# Patient Record
Sex: FEMALE | Race: WHITE
[De-identification: names, ages, dates, MRNs, and addresses within clinical notes are randomized per-mention and may not be internally consistent; named-entity substitution may affect disease eponyms.]

---

## 2019-03-24 ENCOUNTER — HOSPITAL ENCOUNTER (EMERGENCY)
Dept: HOSPITAL 75 - ER | Age: 24
Discharge: HOME | End: 2019-03-24
Payer: COMMERCIAL

## 2019-03-24 VITALS — HEIGHT: 62 IN | WEIGHT: 125 LBS | BODY MASS INDEX: 23 KG/M2

## 2019-03-24 VITALS — SYSTOLIC BLOOD PRESSURE: 120 MMHG | DIASTOLIC BLOOD PRESSURE: 58 MMHG

## 2019-03-24 DIAGNOSIS — R11.2: Primary | ICD-10-CM

## 2019-03-24 LAB
APTT PPP: YELLOW S
BACTERIA #/AREA URNS HPF: (no result) /HPF
BILIRUB UR QL STRIP: NEGATIVE
FIBRINOGEN PPP-MCNC: CLEAR MG/DL
GLUCOSE UR STRIP-MCNC: NEGATIVE MG/DL
KETONES UR QL STRIP: NEGATIVE
LEUKOCYTE ESTERASE UR QL STRIP: NEGATIVE
NITRITE UR QL STRIP: NEGATIVE
PH UR STRIP: 7 [PH] (ref 5–9)
PROT UR QL STRIP: NEGATIVE
RBC #/AREA URNS HPF: (no result) /HPF
SP GR UR STRIP: 1.01 (ref 1.02–1.02)
UROBILINOGEN UR-MCNC: NORMAL MG/DL
WBC #/AREA URNS HPF: (no result) /HPF

## 2019-03-24 PROCEDURE — 84703 CHORIONIC GONADOTROPIN ASSAY: CPT

## 2019-03-24 PROCEDURE — 81000 URINALYSIS NONAUTO W/SCOPE: CPT

## 2019-03-24 PROCEDURE — 99283 EMERGENCY DEPT VISIT LOW MDM: CPT

## 2019-03-24 NOTE — XMS REPORT
Continuity of Care Document (C-CDA R2.1) (Encounter date: 2019 01:48 PM)

 Created on: 2019



Kim Lee

External Reference #: [id number]

: 1995

Sex: Female



Demographics







 Address  [address] [place]  [address]

 

 Home Phone  [phone]

 

 Preferred Language  Unknown

 

 Marital Status  Never 

 

 Episcopal Affiliation  Unknown

 

 Race  White

 

 Ethnic Group  Not  or 





Author







 Author  [facility]

 

 Organization  [facility]

 

 Address  Unknown

 

 Phone  Unavailable







Support







 Name  Relationship  Address  Phone

 

 CHRISTINE Lee  PRS  [address] [place]  [address]  [phone]

 

 KIMMIE Alvarez  Caregiver  Unknown  Unavailable







Care Team Providers







 Care Team Member Name  Role  Phone

 

 Marcell Holguin MD  Unavailable  Unavailable







Allergies, Adverse Reactions, Alerts







 Substance  Reaction  Status

 

 No Known Drug Allergies                                Active







Medications







 Medication  Instructions  Dosage  Effective Dates (start - stop)  Status  
Comments

 

 Aleve 220 mg capsule         -   Active   

 

 acetaminophen 325 mg tablet  take 1 tablet by oral route  every 4 hours as 
needed  325 MG   -   Active   







Problems







 Condition  Effective Dates (start - stop)  Clinical Status  Comments

 

 Abnormal Pap, LSIL  [place] -       

 

 Abnormal Pap, LSIL         

 

 Encounter for pregnancy test, result negative  [place] -       

 

 Pelvic and perineal pain         







Procedures







 Procedure  Date

 

 No information 







Results







 Test Name  Date and Time  Measure  Units  Reference Range  Abnormal Flag  
Status  Comments

 

 No information 







Advance Directives







 Directive  Yes / No  Effective Date  File Name

 

 No information 







Encounters







 Encounter Description  Practice  Location  Reason(s) For Visit  Diagnoses  Date
  Provider  Providers Copied on Encounter

 

    [facility],  [address] [place] [address], [id number],  tel:
[phone]  [facility]          Chelsie Bell [address] [place], [address], [place].  tel:[phone]   

 

    [facility], [address], [place], [place], [address], [place] tel:
[phone]  [facility]     Abnormal Pap, LSILAbnormal Pap, LSILEncounter for 
pregnancy test, result negative  [place]  Chelsie Bell [address], [place], [address], [place].  tel:[phone]  Referring Provider: Bobby [address] [place], [place], [address]. tel:[phone]

 

    [facility], [address], [place], [place], [address], [place] tel:
[phone]  [facility]     Pelvic and perineal pain    Chelsie Faith. [address] [place], [place], [address], [place].  tel:[phone]  Referring 
Provider: Marcell BURKS, [address], [place], [place], [address]. tel:[phone]
[phone]

 

    [facility], [address], [place], [place], [address], [place] tel:
[phone]  [facility]          Chelsie Faith. [address], [place], [address], [place].  tel:[phone]   







Family History







 Family Member  Diagnosis  Age At Onset

 

    No family history of Thyroid Disorder   

 

    No family history of Kidney Problems   

 

    No family history of Cardiovascular Disease   

 

    No family history of Stroke   

 

    No family history of Diabetes   

 

    No family history of Colon Cancer   

 

    No family history of Epilepsy   

 

    No family history of Lung Disease   

 

    No family history of Breast Cancer   

 

    No family history of Ovarian Cancer   

 

    No family history of Osteoporosis   

 

    No family history of Hypertension   







Immunizations







 Vaccine  Date  Status  Comments

 

 No information 







Payers







 Payer name  Insurance type  Covered party ID  Authorization(s)

 

 [facility]  [id number]   







Social History







 Type  Description  Quantity  Date Captured  Comments

 

 Birth Sex  Female         







Vital Signs







 Date / Time:  Height  Weight  BMI  Pulse Rate  Blood Pressure  Temperature  
Respiratory Rate  Body Surface Area  Head Circumference  BMI percentile  Pulse 
Ox  Inhaled Ox

 

 No information 







Chief Complaint And Reason For Visit





No information



Reason For Referral







 Reason For Referral

 

 No information







Plan Of Treatment







 Date  Type  Action  Status

 

   Appointment  Kim Lee  BOOKJOSE RAUL



 



History Of Present Illness







 Encounter Date  Complaint  History Of Present Illness

 

 No information 







Functional Status







 Date  Functional Assessment

 

 No information 







Medications Administered







 Medication  Instructions  Dosage  Effective Dates (start - stop)  Status  
Comments

 

 No information 







Instructions







 Date  Instruction  Additional Information

 

 No information 







Assessments







 Type  Assessment  Date

 

 No information 







Goals







 Health Concern  Goal  Type  Priority  Status  Date

 

 No information 







Medical Equipment







 Description  Device  Universal Device Identifier  Effective Dates (start - stop
)  Status

 

 No information 







Mental Status







 Date  Cognitive Assessment

 

 No information 







Health Concerns







 Observation  Date









 No information 









 Concern  Status  Date

 

 No information

## 2019-03-24 NOTE — XMS REPORT
Continuity of Care Document (C-CDA) (Encounter date: 01/15/2018 09:30 AM)

 Created on: 01/15/2018



Kim Lee

External Reference #: [id number]

: 1995

Sex: Female



Demographics







 Address  [address] [place]  [address]

 

 Home Phone  [phone]

 

 Preferred Language  English

 

 Marital Status  Never 

 

 Yazidi Affiliation  Unknown

 

 Race  White

 

 Ethnic Group  Not  or 





Author







 Author  [facility]

 

 Organization  [facility]

 

 Address  [address], [place]  [address]



 

 Phone  [phone]







Support







 Name  Relationship  Address  Phone

 

 CHRISTINE Lee  PRS  [address] [place]  [address]  [phone]

 

 KIMMIE Alvarez  Unknown  Unavailable







Care Team Providers







 Care Team Member Name  Role  Phone

 

 Antonio Alvarez MD  PCP  Unavailable







Allergies, Adverse Reactions, Alerts







 Substance  Reaction  Severity  Status

 

 No Known Drug Allergies                                Unknown  Active







Medications







 Medication  Instructions  Dosage  Effective Dates (start - stop)  Status  
Comments

 

 Aleve 220 mg capsule         -   Active   

 

 acetaminophen 325 mg tablet  take 1 tablet by oral route  every 4 hours as 
needed      -   Active   







Problems







 Condition  Effective Dates (start - stop)  Clinical Status

 

 Abnormal Pap, LSIL  [place] -    

 

 Abnormal Pap, LSIL      

 

 Pelvic and perineal pain      







Procedures







 Procedure  Date

 

 Colposcpy Of Cervix W/ Upper/adj Vagina; W/Bx Of Cervix & Endocervical 
Curettage  [place]-

 

 Procedure Only Visit  [place]-







Results







 Test Name  Date and Time  Measure  Units  Reference Range  Abnormal Flag  
Comments

 

 Unknown 







Advance Directives







 Directive  Yes / No  Effective Date  File Name

 

 Unknown 







Encounters







 Encounter Description  Practice  Location  Reason(s) For Visit  Diagnoses  Date
  Provider  Care Team Members

 

    [facility], [address], [place], [place], [address], [place] tel:
[phone]  [facility]  colposcopy (chief complaint)  Abnormal Pap, 
LSILAbnormal Pap, LSIL  [place]-  Chelsie Faith. [address] [place], [place]
, [address], [place].  tel:[phone]  Referring Provider: Marcell BURKS, [address] [place], [place], [address]. tel:[phone]

 

    [facility], [address], [place], [place], [address],  tel:
[phone]  [facility]     Pelvic and perineal pain    Chelsie Faith. [address] [place], [place], [address], .  tel:[phone]  Referring 
Provider: Marcell BURKS, [address] [place], [place], [address]. tel:[phone]
[phone]

 

    [facility], [address], [place], [place], [address],  tel:
[phone]  [facility]          Chelsie Faith. [address] 
[place], [place], [address], .  tel:[phone]   







Family History







 Family Member  Diagnosis  Age At Onset

 

    No family history of Thyroid Disorder   

 

    No family history of Kidney Problems   

 

    No family history of Cardiovascular Disease   

 

    No family history of Stroke   

 

    No family history of Diabetes   

 

    No family history of Colon Cancer   

 

    No family history of Epilepsy   

 

    No family history of Lung Disease   

 

    No family history of Breast Cancer   

 

    No family history of Ovarian Cancer   

 

    No family history of Osteoporosis   

 

    No family history of Hypertension   







Immunizations







 Vaccine  Date  Status  Comments

 

 Unknown 







Payers







 Payer name  Insurance type  Covered party ID  Authorization(s)

 

 [facility]  [place]  [id number]   







Social History







 Type  Description  Quantity  Date Captured

 

 Alcohol Use Details  No     [place]-

 

 Caffeine Use Details  No     [place]-

 

 Tobacco Use Status  Never smoked tobacco     [place]-

 

 Smoking Status  Never smoker     [place]-

 

 Non-Smoking Tobacco Use Details  



: No Details Available

  



: No Details Available

  [place]-







Vital Signs







 Date / Time:  Height  Weight  BMI  Pulse Rate  Blood Pressure  Temperature  
Respiratory Rate  Body Surface Area  Head Circumference  BMI percentile

 

 [place]- 9:30 AM  62.00 in  135.00 lbs  24.84 kg/meter(2)     138/82 mm[Hg]
               







Chief Complaint And Reason For Visit





*** Most recent encounter only, dated '01/15/2018 09:30'. ***



colposcopy (chief complaint). Description: Patient had an abnormal pap on 2017.  Level of severity is LSIL. This is a new problem for her.   She has had 
no recent cervical treatment.  She denies vaginal discharge and vaginal itching.



Reason For Referral







 Reason For Referral

 

 Unknown







Plan Of Care







 Date  Type  Action  Status

 

   Future Order: Radiology Order  Pelvic Ultrasound ([id number])  Ordered









 Date  Type  Problem  Goal  Intervention  Status  Start Date









 Unknown. 







History Of Present Illness







 Encounter Date  Complaint  History Of Present Illness

 

 [place]-  colposcopy  Patient had an abnormal pap on 2017.  Level of 
severity is LSIL. This is a new problem for her.   She has had no recent 
cervical treatment.  She denies vaginal discharge and vaginal itching.







Functional Status







 Encounter Date  Functional Assessment  Cognitive Assessment

 

 Unknown 







Medications Administered







 Medication  Instructions  Dosage  Effective Dates (start - stop)  Status  
Comments

 

 Drug Treatment Unknown 







Instructions







 Date  Instruction  Additional Information

 

 Unknown

## 2019-03-24 NOTE — XMS REPORT
Continuity of Care Document (C-CDA) (Encounter date: 2018 12:49 PM)

 Created on: 2018



Kim Lee

External Reference #: [id number]

: 1995

Sex: Female



Demographics







 Address  [address]

[place]  [address]

 

 Home Phone  [phone]

 

 Preferred Language  English

 

 Marital Status  Never 

 

 Scientologist Affiliation  Unknown

 

 Race  White

 

 Ethnic Group  Not  or 





Author







 Author  [facility]

 

 [facility]  [facility]

 

 Address  Unknown

 

 Phone  Unavailable







Support







 Name  Relationship  Address  Phone

 

 CHRISTINE Lee  PRS  [address] [facility] [address]

[place]  [address]  [phone]

 

 KIMMIE Alvarez  Unknown  Unavailable







Care Team Providers







 Care Team Member Name  Role  Phone

 

 Antonio Alvarez MD  PCP  Unavailable







Allergies, Adverse Reactions, Alerts







 Substance  Reaction  Severity  Status

 

 No Known Drug Allergies                                Unknown  Active







Medications







 Medication  Instructions  Dosage  Effective Dates (start - stop)  Status  
Comments

 

 Aleve 220 mg capsule         -   Active   

 

 acetaminophen 325 mg tablet  take 1 tablet by oral route  every 4 hours as 
needed      -   Active   







Problems







 Condition  Effective Dates (start - stop)  Clinical Status

 

 Abnormal Pap, LSIL  [place] -    

 

 Pelvic and perineal pain      

 

 Abnormal Pap, LSIL      

 

 Encounter for pregnancy test, result negative  [place]- -    







Procedures







 Procedure  Date

 

 Unknown 







Results







 Test Name  Date and Time  Measure  Units  Reference Range  Abnormal Flag  
Comments

 

 Unknown 







Advance Directives







 Directive  Yes / No  Effective Date  File Name

 

 Unknown 







Encounters







 Encounter Description  Practice  Location  Reason(s) For Visit  Diagnoses  Date
  Provider  Care Team Members

 

    [facility], [address], [place], [address], [place] tel:
[phone]  [facility]          Chelsie Bell [address] [place], [place], [address], [place].  tel:[phone]   

 

    [facility], [address], [place], [place], [address], [place] tel:
[phone]  [facility]     Abnormal Pap, LSILAbnormal Pap, LSILEncounter for 
pregnancy test, result negative  [place]-  Chelsie Bell [address], [place], [address], [place].  tel:[phone]  Referring Provider: Marcell BURKS [address], [place], [address]. tel:[phone]

 

    [facility], [address], [place], [place], [address], [place] tel:
[phone]  [facility]     Pelvic and perineal pain    Chelsie Faith. [address] [place], [place], [address], [place].  tel:[phone]  Referring 
Provider: Marcell BURKS [address], [place], [place], [address]. tel:[phone]
[phone]

 

    [facility], [address], [place], [place], [address], [place] tel:
[phone]  [facility]          Chelsie Faith. [address], [place], [address], [place].  tel:[phone]   







Family History







 Family Member  Diagnosis  Age At Onset

 

    No family history of Thyroid Disorder   

 

    No family history of Kidney Problems   

 

    No family history of Cardiovascular Disease   

 

    No family history of Stroke   

 

    No family history of Diabetes   

 

    No family history of Colon Cancer   

 

    No family history of Epilepsy   

 

    No family history of Lung Disease   

 

    No family history of Breast Cancer   

 

    No family history of Ovarian Cancer   

 

    No family history of Osteoporosis   

 

    No family history of Hypertension   







Immunizations







 Vaccine  Date  Status  Comments

 

 Unknown 







Payers







 Payer name  Insurance type  Covered party ID  Authorization(s)

 

 [employer] [place]  [place]  [id number]   







Social History







 Type  Description  Quantity  Date Captured

 

 Unknown 







Vital Signs







 Date / Time:  Height  Weight  BMI  Pulse Rate  Blood Pressure  Temperature  
Respiratory Rate  Body Surface Area  Head Circumference  BMI percentile

 

 Unknown 







Chief Complaint And Reason For Visit





Unknown Chief Complaint And Reason For Visit



Reason For Referral







 Reason For Referral

 

 Unknown







Plan Of Care







 Date  Type  Action  Status

 

   Future Order: Radiology Order  Pelvic Ultrasound ([id number])  Ordered









 Date  Type  Problem  Goal  Intervention  Status  Start Date









 Unknown. 







History Of Present Illness







 Encounter Date  Complaint  History Of Present Illness

 

 This patient has no known history of present illness 







Functional Status







 Encounter Date  Functional Assessment  Cognitive Assessment

 

 Unknown 







Medications Administered







 Medication  Instructions  Dosage  Effective Dates (start - stop)  Status  
Comments

 

 Drug Treatment Unknown 







Instructions







 Date  Instruction  Additional Information

 

 Unknown

## 2019-03-24 NOTE — XMS REPORT
Continuity of Care Document (C-CDA) (Encounter date: 2017 09:00 AM)

 Created on: 2017



Kim Lee

External Reference #: [id number]

: 1995

Sex: Female



Demographics







 Address  [address]

[place]  [address]

 

 Home Phone  [phone]

 

 Preferred Language  English

 

 Marital Status  Never 

 

 Gnosticist Affiliation  Unknown

 

 Race  White

 

 Ethnic Group  Not  or 





Author







 Author  [facility]

 

 Organization  [facility]

 

 Address  [address]

[place], [place]  [address]



 

 Phone  [phone]







Support







 Name  Relationship  Address  Phone

 

 CHRISTINE Lee  PRS  [address]

[place]  [address]  [phone]

 

 KIMMIE Alvarez  Unknown  Unavailable







Care Team Providers







 Care Team Member Name  Role  Phone

 

 Antonio Alvarez MD  PCP  Unavailable







Allergies, Adverse Reactions, Alerts







 Substance  Reaction  Severity  Status

 

 No Known Drug Allergies                                Unknown  Active







Medications







 Medication  Instructions  Dosage  Effective Dates (start - stop)  Status  
Comments

 

 Aleve 220 mg capsule         -   Active   

 

 acetaminophen 325 mg tablet  take 1 tablet by oral route  every 4 hours as 
needed      -   Active   







Problems







 Condition  Effective Dates (start - stop)  Clinical Status

 

 Pelvic and perineal pain      







Procedures







 Procedure  Date

 

 Office/outpatient visit,[facility]  







Results







 Test Name  Date and Time  Measure  Units  Reference Range  Abnormal Flag  
Comments

 

 Unknown 







Advance Directives







 Directive  Yes / No  Effective Date  File Name

 

 Unknown 







Encounters







 Encounter Description  Practice  Location  Reason(s) For Visit  Diagnoses  Date
  Provider  Care Team Members

 

 Office/outpatient visit,[facility]  [facility], [address], [place], [address],  tel:[phone]  [facility]  pelvic pain (chief 
complaint)  Pelvic and perineal pain    Chelsie Faith. [address]
[place], [place], [address], [place].  tel:[phone]  Referring Provider: Marcell BURKS [address] [place], [place], [address]. tel:[phone]

 

    [facility], [address], [place], [address],  tel:
[phone]  [facility]          Chelsie Faith. [address] [place], [address], [place].  tel:[phone]   







Family History







 Family Member  Diagnosis  Age At Onset

 

    No family history of Thyroid Disorder   

 

    No family history of Kidney Problems   

 

    No family history of Cardiovascular Disease   

 

    No family history of Stroke   

 

    No family history of Diabetes   

 

    No family history of Colon Cancer   

 

    No family history of Epilepsy   

 

    No family history of Lung Disease   

 

    No family history of Breast Cancer   

 

    No family history of Ovarian Cancer   

 

    No family history of Osteoporosis   

 

    No family history of Hypertension   







Immunizations







 Vaccine  Date  Status  Comments

 

 Unknown 







Payers







 Payer name  Insurance type  Covered party ID  Authorization(s)

 

 [facility] [place]    [id number]   







Social History







 Type  Description  Quantity  Date Captured

 

 Alcohol Use Details  No     

 

 Caffeine Use Details  Unknown     

 

 Tobacco Use Status  Never smoked tobacco     

 

 Smoking Status  Never smoker     







Vital Signs







 Date / Time:  Height  Weight  BMI  Pulse Rate  Blood Pressure  Temperature  
Respiratory Rate  Body Surface Area  Head Circumference  BMI percentile

 

  9:07 AM  62.00 in  131.30 lbs  24.16 kg/meter(2)     122/72 mm[Hg]
               







Chief Complaint And Reason For Visit





*** Most recent encounter only, dated '2017 09:00'. ***



pelvic pain (chief complaint). Description: The pain started suddenly 5days.  
Severity level is moderate.  It occurs continuously.  Region of the pain is 
right lower quadrant. The pain radiates to the back.  The patient describes her 
pain as sharp, and stabbing. This is a new problem for her.  She is also 
experiencing dysmenorrhea.  She denies abdominal distention, constipation, 
diarrhea, dysuria, fever, hematochezia, high risk sexual activity, 
intramenstrual bleeding, nausea, ovarian cysts, vaginal discharge and vomiting. 
pt states taking aleve around the clock for 5 daysand this has provided no 
relief. stats she had a CT  at ER and was told she may have ovarian cyst 
but would need pelvic sono to confirm.



Reason For Referral







 Reason For Referral

 

 Unknown







Plan Of Care







 Date  Type  Action  Status

 

   Appointment  Kim Lee  BOOKED

 

   Future Order: Radiology Order  Pelvic Ultrasound ([id number])  Ordered









 Date  Type  Problem  Goal  Intervention  Status  Start Date









 Unknown. 



 



History Of Present Illness







 Encounter Date  Complaint  History Of Present Illness

 

   pelvic pain  The pain started suddenly 5days.  Severity level is 
moderate.  It occurs continuously.  Region of the pain is right lower quadrant. 
The pain radiates to the back.  The patient describes her pain as sharp, and 
stabbing. This is a new problem for her.  She is also experiencing 
dysmenorrhea.  She denies abdominal distention, constipation, diarrhea, dysuria
, fever, hematochezia, high risk sexual activity, intramenstrual bleeding, 
nausea, ovarian cysts, vaginal discharge and vomiting. pt states taking aleve 
around the clock for 5 daysand this has provided no relief. stats she had a CT 
 at ER and was told she may have ovarian cyst but would need pelvic sono 
to confirm.







Functional Status







 Encounter Date  Functional Assessment  Cognitive Assessment

 

 Unknown 







Medications Administered







 Medication  Instructions  Dosage  Effective Dates (start - stop)  Status  
Comments

 

 Drug Treatment Unknown 







Instructions







 Date  Instruction  Additional Information

 

 Unknown

## 2019-03-24 NOTE — XMS REPORT
Continuity of Care Document (C-CDA) (Encounter date: 2017 08:33 AM)

 Created on: 2018



Kim Lee

External Reference #: [id number]

: 1995

Sex: Female



Demographics







 Address  [address] [place]  [address]

 

 Home Phone  [phone]

 

 Preferred Language  English

 

 Marital Status  Never 

 

 Advent Affiliation  Unknown

 

 Race  White

 

 Ethnic Group  Not  or 





Author







 Author  [facility]

 

 Organization  [facility]

 

 Address  Unknown

 

 Phone  Unavailable







Support







 Name  Relationship  Address  Phone

 

 CHRISTINE Lee  PRS  [address] [facility]

[place]  [address]  [phone]

 

 KIMMIE Alvarez  Unknown  Unavailable







Care Team Providers







 Care Team Member Name  Role  Phone

 

 Antonio Alvarez MD  PCP  Unavailable







Allergies, Adverse Reactions, Alerts







 Substance  Reaction  Severity  Status

 

 No Known Drug Allergies                                Unknown  Active







Medications







 Medication  Instructions  Dosage  Effective Dates (start - stop)  Status  
Comments

 

 Aleve 220 mg capsule         -   Active   

 

 acetaminophen 325 mg tablet  take 1 tablet by oral route  every 4 hours as 
needed      -   Active   







Problems







 Condition  Effective Dates (start - stop)  Clinical Status

 

 Pelvic and perineal pain      







Procedures







 Procedure  Date

 

 Unknown 







Results







 Test Name  Date and Time  Measure  Units  Reference Range  Abnormal Flag  
Comments

 

 Unknown 







Advance Directives







 Directive  Yes / No  Effective Date  File Name

 

 Unknown 







Encounters







 Encounter Description  Practice  Location  Reason(s) For Visit  Diagnoses  Date
  Provider  Care Team Members

 

    [facility], [address], [id number],  tel:
[phone]  [facility]        Dec-  Chelsie Bell [address], [place], [address], [place].  tel:[phone]   

 

    [facility], [address] [place] [address], [id number], [place] tel:
[phone]  [facility]     Pelvic and perineal pain    Chelsie Shah[address], [place], [address], [place].  tel:[phone]  Referring 
Provider: Marcell BURKS [address], [place], [id number]. tel:[phone]
[phone]

 

    [facility], [address] [place] [address], [id number],  tel:
[phone]  [facility]          Chelsie Faith. [address], [place], [address], [place].  tel:[phone]   







Family History







 Family Member  Diagnosis  Age At Onset

 

    No family history of Thyroid Disorder   

 

    No family history of Kidney Problems   

 

    No family history of Cardiovascular Disease   

 

    No family history of Stroke   

 

    No family history of Diabetes   

 

    No family history of Colon Cancer   

 

    No family history of Epilepsy   

 

    No family history of Lung Disease   

 

    No family history of Breast Cancer   

 

    No family history of Ovarian Cancer   

 

    No family history of Osteoporosis   

 

    No family history of Hypertension   







Immunizations







 Vaccine  Date  Status  Comments

 

 Unknown 







Payers







 Payer name  Insurance type  Covered party ID  Authorization(s)

 

 [facility]  [id number]   







Social History







 Type  Description  Quantity  Date Captured

 

 Unknown 







Vital Signs







 Date / Time:  Height  Weight  BMI  Pulse Rate  Blood Pressure  Temperature  
Respiratory Rate  Body Surface Area  Head Circumference  BMI percentile

 

 Unknown 







Chief Complaint And Reason For Visit





Unknown Chief Complaint And Reason For Visit



Reason For Referral







 Reason For Referral

 

 Unknown







Plan Of Care







 Date  Type  Action  Status

 

 [place]-  Appointment  Kim Lee  BOOKED

 

   Future Order: Radiology Order  Pelvic Ultrasound ([id number])  Ordered









 Date  Type  Problem  Goal  Intervention  Status  Start Date









 Unknown. 



 



History Of Present Illness







 Encounter Date  Complaint  History Of Present Illness

 

 This patient has no known history of present illness 







Functional Status







 Encounter Date  Functional Assessment  Cognitive Assessment

 

 Unknown 







Medications Administered







 Medication  Instructions  Dosage  Effective Dates (start - stop)  Status  
Comments

 

 Drug Treatment Unknown 







Instructions







 Date  Instruction  Additional Information

 

 Unknown

## 2019-03-24 NOTE — XMS REPORT
Continuity of Care Document (C-CDA) (Encounter date: 2017 02:07 PM)

 Created on: 2017



Kim Lee

External Reference #: [id number]

: 1995

Sex: Female



Demographics







 Address  [address] [place]  [address]

 

 Home Phone  [phone]

 

 Preferred Language  English

 

 Marital Status  Never 

 

 Voodoo Affiliation  Unknown

 

 Race  White

 

 Ethnic Group  Not  or 





Author







 Author  [facility]

 

 Organization  [facility]

 

 Address  Unknown

 

 Phone  Unavailable







Support







 Name  Relationship  Address  Phone

 

 CHRISTINE Lee  PRS  [address] [facility]

[place]  [address]  [phone]

 

 KIMMIE Alvarez  Unknown  Unavailable







Care Team Providers







 Care Team Member Name  Role  Phone

 

 Antonio Alvarez MD  PCP  Unavailable







Allergies, Adverse Reactions, Alerts







 Substance  Reaction  Severity  Status

 

 No Known Drug Allergies                                Unknown  Active







Medications







 Medication  Instructions  Dosage  Effective Dates (start - stop)  Status  
Comments

 

 Aleve 220 mg capsule         -   Active   

 

 acetaminophen 325 mg tablet  take 1 tablet by oral route  every 4 hours as 
needed      -   Active   







Problems







 Condition  Effective Dates (start - stop)  Clinical Status

 

 Pelvic and perineal pain      







Procedures







 Procedure  Date

 

 Unknown 







Results







 Test Name  Date and Time  Measure  Units  Reference Range  Abnormal Flag  
Comments

 

 Unknown 







Advance Directives







 Directive  Yes / No  Effective Date  File Name

 

 Unknown 







Encounters







 Encounter Description  Practice  Location  Reason(s) For Visit  Diagnoses  Date
  Provider  Care Team Members

 

    [facility], [address], [id number],  tel:
[phone]  [facility]        Dec-  Chelsie Bell [address], [place], [address], [place].  tel:[phone]   

 

    [facility], [address] [place] [address], [id number], [place] tel:
[phone]  [facility]     Pelvic and perineal pain    Chelsie [address], [place], [address], [place].  tel:[phone]  Referring 
Provider: Marcell BURKS [address], [place], [id number]. tel:[phone]
[phone]

 

    [facility], [address] [place] [address], [id number],  tel:
[phone]  [facility]          Chelsie Faith. [address], [place], [address], [place].  tel:[phone]   







Family History







 Family Member  Diagnosis  Age At Onset

 

    No family history of Thyroid Disorder   

 

    No family history of Kidney Problems   

 

    No family history of Cardiovascular Disease   

 

    No family history of Stroke   

 

    No family history of Diabetes   

 

    No family history of Colon Cancer   

 

    No family history of Epilepsy   

 

    No family history of Lung Disease   

 

    No family history of Breast Cancer   

 

    No family history of Ovarian Cancer   

 

    No family history of Osteoporosis   

 

    No family history of Hypertension   







Immunizations







 Vaccine  Date  Status  Comments

 

 Unknown 







Payers







 Payer name  Insurance type  Covered party ID  Authorization(s)

 

 [facility]  [id number]   







Social History







 Type  Description  Quantity  Date Captured

 

 Unknown 







Vital Signs







 Date / Time:  Height  Weight  BMI  Pulse Rate  Blood Pressure  Temperature  
Respiratory Rate  Body Surface Area  Head Circumference  BMI percentile

 

 Unknown 







Chief Complaint And Reason For Visit





Unknown Chief Complaint And Reason For Visit



Reason For Referral







 Reason For Referral

 

 Unknown







Plan Of Care







 Date  Type  Action  Status

 

 [place]-  Appointment  Kim Lee  BOOKED

 

   Future Order: Radiology Order  Pelvic Ultrasound ([id number])  Ordered









 Date  Type  Problem  Goal  Intervention  Status  Start Date









 Unknown. 



 



History Of Present Illness







 Encounter Date  Complaint  History Of Present Illness

 

 This patient has no known history of present illness 







Functional Status







 Encounter Date  Functional Assessment  Cognitive Assessment

 

 Unknown 







Medications Administered







 Medication  Instructions  Dosage  Effective Dates (start - stop)  Status  
Comments

 

 Drug Treatment Unknown 







Instructions







 Date  Instruction  Additional Information

 

 Unknown

## 2019-03-24 NOTE — XMS REPORT
Continuity of Care Document (C-CDA) (Encounter date: 01/15/2018 09:30 AM)

 Created on: 2018



Kim Lee

External Reference #: [id number]

: 1995

Sex: Female



Demographics







 Address  [address] [facility] [place]  [address]

 

 Home Phone  [phone]

 

 Preferred Language  English

 

 Marital Status  Never 

 

 Jehovah's witness Affiliation  Unknown

 

 Race  White

 

 Ethnic Group  Not  or 





Author







 Author  [facility]

 

 Organization  [facility]

 

 Address  Unknown

 

 Phone  Unavailable







Support







 Name  Relationship  Address  Phone

 

 CHRISTINE Lee  PRS  [address] [facility] [place]  [address]  [phone]

 

 KIMMIE Alvarez  Unknown  Unavailable







Care Team Providers







 Care Team Member Name  Role  Phone

 

 Lila Alvarez MD  PCP  Unavailable







Allergies, Adverse Reactions, Alerts







 Substance  Reaction  Severity  Status

 

 No Known Drug Allergies                                Unknown  Active







Medications







 Medication  Instructions  Dosage  Effective Dates (start - stop)  Status  
Comments

 

 Aleve 220 mg capsule         -   Active   

 

 acetaminophen 325 mg tablet  take 1 tablet by oral route  every 4 hours as 
needed      -   Active   







Problems







 Condition  Effective Dates (start - stop)  Clinical Status

 

 Abnormal Pap, LSIL  [place]- -    

 

 Abnormal Pap, LSIL      

 

 Encounter for pregnancy test, result negative  [place]- -    

 

 Pelvic and perineal pain      







Procedures







 Procedure  Date

 

 Colposcpy Of Cervix W/ Upper/adj Vagina; W/Bx Of Cervix & Endocervical 
Curettage  [place]-

 

 Procedure Only Visit  [place]-

 

 Urine pregnancy test  [place]-







Results







 Test Name  Date and Time  Measure  Units  Reference Range  Abnormal Flag  
Comments

 

 Panel Description: Surgical Order 

 

 PARKER  [place]- 10:12:00  See Note           .                Rickie Sim DO, Medical.                Director.                 [facility]. 
               [employer] [facility]                [address] [facility] [address].      
           CLIA #[id number].                [address],.         
       [address]  CLIA #[id number].                [facility].   
             [employer] [facility]                [address] [facility],.       
         [address]  CLIA #[id number].                                               
Number [id number].                Page 1 of 1Final 
Surgical Pathology ReportDiagnosis:A.  Endocervix, ECC:.    Fragments of 
negative endocervix and benign superficial squamous.    epithelium..    No 
evidence of dysplasia or malignancy is identified.B.  Cervix, biopsy:.    Low-
grade squamous intraepithelial lesion (ROSELIA I).    Transformation zone is 
identified.Clinical Information:A. Endocervical curettage.  B. Cervical 
biopsy.Diagnosis code R87.612.  Date of last pap on 2017 result 
LSIL.Source:A. ENDOCERVICAL CURETTINGSB. CERVICAL BIOPSYGross Description:A.  
Received in formalin in a container labeled "Kim Lee" and"ECC" and is 
0.1 x 0.1 x less than 0.1 cm aggregate of mucinous,semitranslucent material.  
Submitted in toto in lens paper in cassetteA1.B.  Received in formalin in a 
container labeled "Kim Lee" and"cervical biopsy" is an irregular piece 
of rubbery tan-yellow totan-white tissue, 0.4 x 0.3 x 0.2 cm.  Submitted in 
toto in lens paperin cassette B1.  POK/mg 1/15/2018 2:38 PM job 
[id number] Description:Microscopic examination is performed.   ML/mg  10:19 AM [id number] diagnosis was rendered at [address], [facility] # [id number]..<Sign Out Dr. Clement>LILA URBAN MD ph: [phone].                
                            Printed: 2018 11:18 AMASL4 SOURCE LEVEL 4 CPT: 
88305 x 2.BSL4 SOURCE LEVEL 4 CPT: 09367 x 2Procedure Date 01/15/2018Received 
Date 01/15/2018             KIM LEE BReported Date 2018.        
                            MR #  [id number].                                    
Case #  [id number]   Pat Type  [facility]       Sex  F      
    1995[facility]                      Age  22 YUnit      
                      Physician  MARCELL HUYNH MD







Advance Directives







 Directive  Yes / No  Effective Date  File Name

 

 Unknown 







Encounters







 Encounter Description  Practice  Location  Reason(s) For Visit  Diagnoses  Date
  Provider  Care Team Members

 

    [facility] [address], [place], [place], [id number], [place] tel:
[phone]  [facility]  colposcopy (chief complaint)  Abnormal Pap, 
LSILAbnormal Pap, LSILEncounter for pregnancy test, result negative  [place]-15-
2018  Chelsie Faith. [address], [place], [address], .  tel:[phone]
[phone]  Referring Provider: Marcell BURKS [address], [place], 
[address]. tel:[phone]

 

    [facility], [address], [place], [place], [address],  tel:
[phone]  [facility]     Pelvic and perineal pain    Chelsie Faith. [address], [place], [address], .  tel:[phone]  Referring 
Provider: Marcell BURKS [address] [place], [address]. tel:[phone]
[phone]

 

    [facility], [address], [place], [place], [address],  tel:
[phone]  [facility]          Chelsie Faith. [address], [place], [address], .  tel:[phone]   







Family History







 Family Member  Diagnosis  Age At Onset

 

    No family history of Thyroid Disorder   

 

    No family history of Kidney Problems   

 

    No family history of Cardiovascular Disease   

 

    No family history of Stroke   

 

    No family history of Diabetes   

 

    No family history of Colon Cancer   

 

    No family history of Epilepsy   

 

    No family history of Lung Disease   

 

    No family history of Breast Cancer   

 

    No family history of Ovarian Cancer   

 

    No family history of Osteoporosis   

 

    No family history of Hypertension   







Immunizations







 Vaccine  Date  Status  Comments

 

 Unknown 







Payers







 Payer name  Insurance type  Covered party ID  Authorization(s)

 

 [facility] [place]  [place]  [id number]   







Social History







 Type  Description  Quantity  Date Captured

 

 Alcohol Use Details  No     [place]-

 

 Caffeine Use Details  No     [place]-

 

 Tobacco Use Status  Never smoked tobacco     [place]-

 

 Smoking Status  Never smoker     [place]-

 

 Non-Smoking Tobacco Use Details  



: No Details Available

  



: No Details Available

  [place]-







Vital Signs







 Date / Time:  Height  Weight  BMI  Pulse Rate  Blood Pressure  Temperature  
Respiratory Rate  Body Surface Area  Head Circumference  BMI percentile

 

 [place]- 9:30 AM  62.00 in  135.00 lbs  24.84 kg/meter(2)     138/82 mm[Hg]
               







Chief Complaint And Reason For Visit





*** Most recent encounter only, dated '01/15/2018 09:30'. ***



colposcopy (chief complaint). Description: Patient had an abnormal pap on 2017.  Level of severity is LSIL. This is a new problem for her.   She has had 
no recent cervical treatment.  She denies vaginal discharge and vaginal itching.



Reason For Referral







 Reason For Referral

 

 Unknown







Plan Of Care







 Date  Type  Action  Status

 

   Future Order: Radiology Order  Pelvic Ultrasound ([id number])  Ordered









 Date  Type  Problem  Goal  Intervention  Status  Start Date









 Unknown. 







History Of Present Illness







 Encounter Date  Complaint  History Of Present Illness

 

 [place]-  colposcopy  Patient had an abnormal pap on 2017.  Level of 
severity is LSIL. This is a new problem for her.   She has had no recent 
cervical treatment.  She denies vaginal discharge and vaginal itching.







Functional Status







 Encounter Date  Functional Assessment  Cognitive Assessment

 

 Unknown 







Medications Administered







 Medication  Instructions  Dosage  Effective Dates (start - stop)  Status  
Comments

 

 Drug Treatment Unknown 







Instructions







 Date  Instruction  Additional Information

 

 Unknown

## 2019-03-24 NOTE — XMS REPORT
Continuity of Care Document

 Created on: 2019



Kim Lee

External Reference #: [id number]

: 1995

Sex: Female



Demographics







 Address  [address] [place] [address]

 

 Home Phone  [phone] x

 

 Preferred Language  Unknown

 

 Marital Status  Unknown

 

 Mosque Affiliation  Unknown

 

 Race  Unknown

 

 Ethnic Group  Unknown





Author







 Author  [facility]

 

 Organization  [facility]

 

 Address  Unknown

 

 Phone  Unavailable



              



Allergies

      





 Active            Description            Code            Type            
Severity            Reaction            Onset            Reported/Identified   
         Relationship to Patient            Clinical Status        

 

 Yes            No Known Drug Allergies            922819            3         
   N/A            N/A                                                 
          



                  



Medications

      





 Medication            Packaging            Start Date            Stop Date    
        Route            Dosage            Sig        

 

             SPRINTEC                      Tablet            2014                                                  take 1 tablet 
by oral route  every day                  



                  



Problems

      



There is no data.                  



Procedures

      



There is no data.                  



Results

      





 Test            Result            Range        









 ANTINUCLEAR ANTIBODIES, IFA - 17 13:26         

 

 COMPLETE BLOOD COUNT - 17 13:26         









 WBC            11.02 K/uL            4.00-12.00        

 

 RBC            4.59 M/uL            4.20-5.40        

 

 HEMOGLOBIN            13.8 g/dL            12.0-16.0        

 

 HCT            39.4 %            37.0-47.0        

 

 MCV            85.8 fL            80.0-95.0        

 

 MCH            30.1 PG            24.0-31.0        

 

 MCHC            35.0 g/dL            21.0-36.0        

 

 PLATELET COUNT            274 K/uL            150-400        

 

 RDW-CV            12.2 %            11.5-14.5        

 

 MPV            9.2 fL            6.9-10.6        

 

 NEUT %            75.5 %            55.0-70.0        

 

 LYMPH %            16.0 %            20.0-40.0        

 

 MONO%            7.7 %            2.0-8.0        

 

 EOS%            0.5 %            0.0-4.0        

 

 BASO%            0.3 %            0.0-1.0        

 

 NEUT #            8.32 K/uL            2.75-7.00        

 

 LYMPH#            1.76 K/uL            0.60-3.40        

 

 MONO#            0.85 K/uL            0.00-0.70        

 

 EOS #            0.06 K/uL            0.00-0.40        

 

 BASO #            0.03 K/uL            0.00-0.10        









 ANTINUCLEAR ANTIBODIES DIRECT - 17 13:49         









 ANTINUCLEAR ANTIBODIES DIRECT [id number]            Sent to reference lab report 
to follow             NRG        









 TSH - 17 13:49         









 TSH            1.106 uIU/mL            0.358-3.740        









 COMPLETE BLOOD COUNT - 17 06:23         









 WBC            6.82 K/uL            4.00-12.00        

 

 RBC            4.61 M/uL            4.20-5.40        

 

 HEMOGLOBIN            13.8 g/dL            12.0-16.0        

 

 HCT            39.4 %            37.0-47.0        

 

 MCV            85.5 fL            80.0-95.0        

 

 MCH            29.9 PG            24.0-31.0        

 

 MCHC            35.0 g/dL            21.0-36.0        

 

 PLATELET COUNT            282 K/uL            150-400        

 

 RDW-CV            12.2 %            11.5-14.5        

 

 MPV            9.1 fL            6.9-10.6        

 

 NEUT %            57.2 %            55.0-70.0        

 

 LYMPH %            30.2 %            20.0-40.0        

 

 MONO%            10.4 %            2.0-8.0        

 

 EOS%            1.9 %            0.0-4.0        

 

 BASO%            0.3 %            0.0-1.0        

 

 NEUT #            3.90 K/uL            2.75-7.00        

 

 LYMPH#            2.06 K/uL            0.60-3.40        

 

 MONO#            0.71 K/uL            0.00-0.70        

 

 EOS #            0.13 K/uL            0.00-0.40        

 

 BASO #            0.02 K/uL            0.00-0.10        









 PREGNANCY TEST, URINE - 17 06:28         









 PREGNANCY TEST, URINE            NEGATIVE             NEGATIVE        









 UA DIP STICK - 17 06:28         









 COLOR            YELLOW             YELLOW        

 

 APPEARANCE            CLEAR             CLEAR        

 

 SPECIFIC GRAVITY            1.025             1.005-1.025        

 

 PH            6.5             6.0-7.0        

 

 LEUKOCYTES            NEGATIVE             NEGATIVE        

 

 NITRITE            NEGATIVE             NEGATIVE        

 

 PROTEIN            NEGATIVE             NEGATIVE        

 

 GLUCOSE, SERUM            NEGATIVE             NEGATIVE        

 

 KETONE            NEGATIVE             NEGATIVE        

 

 UROBILINOGEN            0.2 mg/dL            0.2-1.0        

 

 BILIRUBIN            NEGATIVE             NEGATIVE        

 

 BLOOD            NEGATIVE             NEGATIVE        









 COMP METABOLIC  PROFILE - 17 06:54         









 GLUCOSE            97 mg/dL                    

 

 BUN            17 mg/dL            7-18        

 

 CREATININE            0.84 mg/dL            0.60-1.00        

 

 SODIUM, SERUM            143 mmol/L            136-145        

 

 POTASSIUM            4.1 mmol/L            3.5-5.1        

 

 CHLORIDE            106 mmol/L                    

 

 CARBON DIOXIDE            28.6 mmol/L            21.0-32.0        

 

 BUN / CREAT RATIO            20.2 CALC            10.0-25.0        

 

 ANION GAP            12.5 mmol/L            8.0-20.0        

 

 CALCIUM            9.0 mg/dL            8.5-10.1        

 

 TOTAL PROTEIN            7.6 g/dL            6.4-8.2        

 

 ALBUMIN            3.9 g/dL            3.4-5.0        

 

 GLOBULIN            3.7 g/dL            NRG        

 

 A/G RATIO            1.1 RATIO            1.1-2.2        

 

 AST (SGOT)            15 U/L            15-37        

 

 ALT (SGPT)            22 U/L            12-78        

 

 ALKALINE PHOSPHATASE            44 mg/dL                    

 

 TOTAL BILIRUBIN            0.4 mg/dL            0.2-1.0        

 

 GFR             >60             >=60        

 

 GFR             >60             >=60        

 

 OSMOLALITY            287             NRG        









   - 18 15:33         









             Denae Khan                      

 

 Donor ID By            Employer Representitive                      

 

 Location            [place]                      

 

 Reason For Test            Pre-Employment                      

 

 Temperature In Range            YES Deg F            90..00        

 

 Urine Amphetamines            NEGATIVE                      

 

 Urine Barbiturates            NEGATIVE                      

 

 Urine Benzodiazepines            NEGATIVE                      

 

 Urine Cocaine            NEGATIVE                      

 

 Urine MDMA            NEGATIVE                      

 

 Urine Methadone            NEGATIVE                      

 

 Urine Methamphetamines            NEGATIVE                      

 

 Urine Opiates            NEGATIVE                      

 

 Urine Oxycodone            NEGATIVE                      

 

 Urine PCP            NEGATIVE                      

 

 Urine THC Metabolite            NEGATIVE                      



                                



Encounters

      





 ACCT No.            Visit Date/Time            Discharge            Status    
        Pt. Type            Provider            Facility            Loc./Unit  
          Complaint        

 

 [id number]            2019 13:48:00            2019 23:59:59         
   [facility]            Outpatient            Marcell Holguin                       
                        

 

 [id number]            2018 12:49:00            2018 23:59:59         
   [facility]            Outpatient            Marcell Holguin                       
                        

 

 [id number]            01/15/2018 09:30:00            01/15/2018 23:59:59         
   [facility]            Outpatient            Marcell Holguin                       
                        

 

 [id number]            2017 09:15:00            2017 23:59:59         
   [facility]            Outpatient            Rachele Loredo                     
                          

 

 [id number]            2017 08:33:00            2017 23:59:59         
   [facility]            Outpatient            Marcell Holguin                       
                        

 

 [id number]            2017 14:07:00            2017 23:59:59         
   [facility]            Outpatient            Marcell Holguin                       
                        

 

 [id number]            2017 09:00:00            2017 23:59:59         
   [facility]            Outpatient            Marcell Holguin                       
                        

 

 [id number]            2017 09:05:50                                      
Document Registration                                                          
  

 

 [id number]            2018 15:14:00            2018 23:59:00          
  [facility]            Outpatient            UNLISTED, UNLISTED                      
                         

 

 [id number]            2017 05:33:00            2017 09:35:00    
        [facility]            Emergency            Shania Monteisnos                       
  4                     

 

 [id number]            2017 12:52:00            2017 23:59:00    
        [facility]            Outpatient            Denae Neville                   
      49                     

 

 [id number]            2017 07:23:00                                      
Document Registration                                                          
  

 

 [id number]            2017 14:26:00                                      
Document Registration

## 2019-03-24 NOTE — XMS REPORT
Continuity of Care Document (C-CDA) (Encounter date: 2017 09:15 AM)

 Created on: 2018



Kim Lee

External Reference #: [id number]

: 1995

Sex: Female



Demographics







 Address  [address] [place]  [address]

 

 Home Phone  [phone]

 

 Preferred Language  English

 

 Marital Status  Never 

 

 Adventism Affiliation  Unknown

 

 Race  White

 

 Ethnic Group  Not  or 





Author







 Author  [facility]

 

 Organization  [facility]

 

 Address  Unknown

 

 Phone  Unavailable







Support







 Name  Relationship  Address  Phone

 

 CHRISTINE Lee  PRS  [address] [facility]

[place]  [address]  [phone]

 

 KIMMIE Alvarez  Unknown  Unavailable







Care Team Providers







 Care Team Member Name  Role  Phone

 

 Antonio Alvarez MD  PCP  Unavailable







Allergies, Adverse Reactions, Alerts







 Substance  Reaction  Severity  Status

 

 No Known Drug Allergies                                Unknown  Active







Medications







 Medication  Instructions  Dosage  Effective Dates (start - stop)  Status  
Comments

 

 Aleve 220 mg capsule         -   Active   

 

 acetaminophen 325 mg tablet  take 1 tablet by oral route  every 4 hours as 
needed      -   Active   







Problems







 Condition  Effective Dates (start - stop)  Clinical Status

 

 Pelvic and perineal pain      







Procedures







 Procedure  Date

 

 Unknown 







Results







 Test Name  Date and Time  Measure  Units  Reference Range  Abnormal Flag  
Comments

 

 Unknown 







Advance Directives







 Directive  Yes / No  Effective Date  File Name

 

 Unknown 







Encounters







 Encounter Description  Practice  Location  Reason(s) For Visit  Diagnoses  Date
  Provider  Care Team Members

 

    [facility], [address] [place] [address], [id number],  tel:
[phone]  [facility]        Dec-  Facundo Jeffrey. [address], [place], [id number], [place].  tel:[phone]   

 

    [facility], [address] [place] [address], [id number], [place] tel:
[phone]  [facility]     Pelvic and perineal pain    Chelsie Faith. [address], [place], [id number], [place].  tel:[phone]  Referring 
Provider: Marcell BURKS, [address], [place], [id number]. tel:[phone]
[phone]

 

    [facility], [address], [place], [id number], [place] tel:
[phone]  [facility]          Chelsie Faith. [address], [place], [id number], [place].  tel:[phone]   







Family History







 Family Member  Diagnosis  Age At Onset

 

    No family history of Thyroid Disorder   

 

    No family history of Kidney Problems   

 

    No family history of Cardiovascular Disease   

 

    No family history of Stroke   

 

    No family history of Diabetes   

 

    No family history of Colon Cancer   

 

    No family history of Epilepsy   

 

    No family history of Lung Disease   

 

    No family history of Breast Cancer   

 

    No family history of Ovarian Cancer   

 

    No family history of Osteoporosis   

 

    No family history of Hypertension   







Immunizations







 Vaccine  Date  Status  Comments

 

 Unknown 







Payers







 Payer name  Insurance type  Covered party ID  Authorization(s)

 

 [facility]  [id number]   







Social History







 Type  Description  Quantity  Date Captured

 

 Unknown 







Vital Signs







 Date / Time:  Height  Weight  BMI  Pulse Rate  Blood Pressure  Temperature  
Respiratory Rate  Body Surface Area  Head Circumference  BMI percentile

 

 Unknown 







Chief Complaint And Reason For Visit





Unknown Chief Complaint And Reason For Visit



Reason For Referral







 Reason For Referral

 

 Unknown







Plan Of Care







 Date  Type  Action  Status

 

 [place]-  Appointment  Kim Lee  BOOKED

 

   Future Order: Radiology Order  Pelvic Ultrasound ([id number])  Ordered









 Date  Type  Problem  Goal  Intervention  Status  Start Date









 Unknown. 



 



History Of Present Illness







 Encounter Date  Complaint  History Of Present Illness

 

 This patient has no known history of present illness 







Functional Status







 Encounter Date  Functional Assessment  Cognitive Assessment

 

 Unknown 







Medications Administered







 Medication  Instructions  Dosage  Effective Dates (start - stop)  Status  
Comments

 

 Drug Treatment Unknown 







Instructions







 Date  Instruction  Additional Information

 

 Unknown

## 2019-03-24 NOTE — ED GI
General


Chief Complaint:  Abdominal/GI Problems


Stated Complaint:  VOMITING


Nursing Triage Note:  


AMBULATORY TO ED WITH C/O VOMITING SINCE MIDNIGHT. DENIES ABD PAIN AND 

DIARRHEA. 


DRANK 2 BEERS EARLIER, THEN WATER WITH DINNER. HAS NOT TAKEN MEDICATIONS PTA.


Sepsis Screen:  No Definite Risk


Source of Information:  Patient


Exam Limitations:  No Limitations





History of Present Illness


Date Seen by Provider:  Mar 24, 2019


Time Seen by Provider:  02:43


Initial Comments


PT ARRIVES VIA POV FROM HOME


STATES SHE WOKE UP AT MIDNIGHT AND HAD NAUSEA AND HAS VOMITED X 4 WITH DRY 

HEAVES X 1


NO DIARRHEA


NO ABDOMINAL PAIN


NO FEVER


NO PROBLEMS URINATING, VOIDED ON ARRIVAL, AND LAST VOID WAS AROUND 1930





PT HAD A COUPLE OF BEERS TONIGHT AND ATE AT Home Inns EARLIER THIS 

EVENING. 


NO SUSPICIOUS FOODS


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





[id number]





PCP: [place]





Allergies and Home Medications


Allergies


Coded Allergies:  


     No Known Drug Allergies (Unverified , 3/24/19)





Home Medications


Ondansetron 4 Mg Tab.rapdis, 4 MG PO Q4H


   Prescribed by: WILLY NELSON on 3/24/19 0327





Patient Home Medication List


Home Medication List Reviewed:  Yes





Review of Systems


Review of Systems


Constitutional:  no symptoms reported


Respiratory:  No Symptoms Reported


Cardiovascular:  No Symptoms Reported


Gastrointestinal:  See HPI; Denies Abdominal Pain, Denies Diarrhea; Nausea, 

Vomiting


Genitourinary:  No Symptoms Reported


Musculoskeletal:  no symptoms reported


Skin:  no symptoms reported


Psychiatric/Neurological:  No Symptoms Reported


Endocrine:  No Symptoms Reported





Past Medical-Social-Family Hx


Patient Social History


Alcohol Use:  Occasionally Uses


Recreational Drug Use:  No


Smoking Status:  Never a Smoker


Recent Foreign Travel:  No


Contact w/Someone Who Travel:  No


Recent Infectious Disease Expo:  No


Recent Hopitalizations:  No





Seasonal Allergies


Seasonal Allergies:  No





Past Medical History


Surgeries:  Yes (2 RIGHT KNEE SCOPES)


Orthopedic


Respiratory:  No


Cardiac:  No


Neurological:  No


Genitourinary:  No


Gastrointestinal:  No


Musculoskeletal:  Yes (RIGHT KNEE SCOPE X 2)


Endocrine:  No


HEENT:  No


Cancer:  No


Psychosocial:  No


Integumentary:  No


Blood Disorders:  No





Physical Exam


Vital Signs





Vital Signs - First Documented








 3/24/19 3/24/19





 02:35 03:35


 


Temp 97.5 


 


Pulse 89 


 


Resp 18 


 


B/P (MAP) 130/73 (92) 


 


Pulse Ox  100


 


O2 Delivery Room Air 





Capillary Refill : Less Than 3 Seconds


Height/Weight/BMI


Height: 5'2.00"


Weight: 125lbs. oz. 56.469181hm;  BMI


Method:Stated


General Appearance:  WD/WN, no apparent distress, thin


HEENT:  other (ORAL MUCOSA MOIST)


Respiratory:  normal breath sounds, no respiratory distress, no accessory 

muscle use


Cardiovascular:  normal peripheral pulses, regular rate, rhythm, no murmur


Gastrointestinal:  normal bowel sounds, non tender, soft, no organomegaly


Extremities:  normal inspection, normal capillary refill


Back:  normal inspection, no CVA tenderness


Neurologic/Psychiatric:  CNs II-XII nml as tested, no motor/sensory deficits, 

alert, normal mood/affect, oriented x 3


Skin:  normal color, warm/dry; No rash





Progress/Results/Core Measures


Results/Orders


Lab Results





Laboratory Tests








Test


 3/24/19


02:35 Range/Units


 


 


Urine Color YELLOW   


 


Urine Clarity CLEAR   


 


Urine pH 7  5-9  


 


Urine Specific Gravity 1.010 L 1.016-1.022  


 


Urine Protein NEGATIVE  NEGATIVE  


 


Urine Glucose (UA) NEGATIVE  NEGATIVE  


 


Urine Ketones NEGATIVE  NEGATIVE  


 


Urine Nitrite NEGATIVE  NEGATIVE  


 


Urine Bilirubin NEGATIVE  NEGATIVE  


 


Urine Urobilinogen NORMAL  NORMAL  MG/DL


 


Urine Leukocyte Esterase NEGATIVE  NEGATIVE  


 


Urine RBC (Auto) NEGATIVE  NEGATIVE  


 


Urine RBC NONE   /HPF


 


Urine WBC NONE   /HPF


 


Urine Squamous Epithelial


Cells 10-25 H


  /HPF





 


Urine Crystals NONE   /LPF


 


Urine Bacteria TRACE   /HPF


 


Urine Casts NONE   /LPF


 


Urine Mucus NEGATIVE   /LPF


 


Urine Culture Indicated NO   








My Orders





Orders - WILLY NELSON DO


Ua Culture If Indicated (3/24/19 02:43)


Urine Pregnancy Bedside (3/24/19 02:43)


Ondansetron  Oral Dissolve Tab (Zofran (3/24/19 02:53)





Vital Signs/I&O











 3/24/19 3/24/19





 02:35 03:35


 


Temp 97.5 97.5


 


Pulse 89 88


 


Resp 18 18


 


B/P (MAP) 130/73 (92) 120/58 (78)


 


Pulse Ox  100


 


O2 Delivery Room Air Room Air














Blood Pressure Mean:  92











Progress


Progress Note :  


Progress Note


GIVEN ZOFRAN ODT--NAUSEA RESOLVED, PT TOLERATING WATER AND ICE CHIPS PRIOR TO 

DISMISSAL





Departure


Impression





 Primary Impression:  


 Nausea and vomiting


Disposition:  01 HOME, SELF-CARE


Condition:  Improved





Departure-Patient Inst.


Referrals:  


NO,LOCAL PHYSICIAN (PCP/Family)


Primary Care Physician


Patient Instructions:  Nausea and Vomiting, Adult (DC)





Add. Discharge Instructions:  


CLEAR LIQUIDS--WATER, BROTH, JELLO, GATORADE





WHEN NAUSEA IS GONE, ADD BRATS DIET TO CLEAR LIQUIDS--BANANAS, RICE, APPLESAUCE

, TOAST, SALTINES





FOLLOW UP WITH YOUR DR ON MONDAY IF NO BETTER, RETURN TO ER IF WORSE





All discharge instructions reviewed with patient and/or family. Voiced 

understanding.


Scripts


Ondansetron (Ondansetron Odt) 4 Mg Tab.rapdis


4 MG PO Q4H for Nausea/Vomiting, #10 TAB


   Prov: WILLY NELSON DO         3/24/19











WILLY NELSON DO Mar 24, 2019 03:28

## 2023-08-07 NOTE — XMS REPORT
Continuity of Care Document (C-CDA) (Encounter date: 2017 09:00 AM)

 Created on: 2017



Kim Lee

External Reference #: [id number]

: 1995

Sex: Female



Demographics







 Address  [address]

[place]  [address]

 

 Home Phone  [phone]

 

 Preferred Language  English

 

 Marital Status  Never 

 

 Caodaism Affiliation  Unknown

 

 Race  White

 

 Ethnic Group  Not  or 





Author







 Author  [facility]

 

 Organization  [facility]

 

 Address  Unknown

 

 Phone  Unavailable







Support







 Name  Relationship  Address  Phone

 

 CHRISTINE Lee  PRS  [address] [place]  [address]  [phone]

 

 KIMMIE Alvarez  Unknown  Unavailable







Care Team Providers







 Care Team Member Name  Role  Phone

 

 Antonio Alvarez MD  PCP  Unavailable







Allergies, Adverse Reactions, Alerts







 Substance  Reaction  Severity  Status

 

 No Known Drug Allergies                                Unknown  Active







Medications







 Medication  Instructions  Dosage  Effective Dates (start - stop)  Status  
Comments

 

 Aleve 220 mg capsule         -   Active   

 

 acetaminophen 325 mg tablet  take 1 tablet by oral route  every 4 hours as 
needed      -   Active   







Problems







 Condition  Effective Dates (start - stop)  Clinical Status

 

 Pelvic and perineal pain      







Procedures







 Procedure  Date

 

 Office/outpatient visit,[facility]  







Results







 Test Name  Date and Time  Measure  Units  Reference Range  Abnormal Flag  
Comments

 

 Unknown 







NOTE: This patient has pending results not included in this document.



Advance Directives







 Directive  Yes / No  Effective Date  File Name

 

 Unknown 







Encounters







 Encounter Description  Practice  Location  Reason(s) For Visit  Diagnoses  Date
  Provider  Care Team Members

 

 Office/outpatient visit,[facility], [address], [place], [id number],  tel:[phone]  [facility]  pelvic pain (chief 
complaint)  Pelvic and perineal pain    [address]sie [address]
[place], [place], [id number], [place].  tel:[phone]  Referring Provider: Bobby Bourne [place], [place], [id number]. tel:[phone]

 

    [facility], [address], [place], [id number], [place] tel:
[phone]  [facility]          [place], [id number], [place].  tel:[phone]   







Family History







 Family Member  Diagnosis  Age At Onset

 

    No family history of Thyroid Disorder   

 

    No family history of Kidney Problems   

 

    No family history of Cardiovascular Disease   

 

    No family history of Stroke   

 

    No family history of Diabetes   

 

    No family history of Colon Cancer   

 

    No family history of Epilepsy   

 

    No family history of Lung Disease   

 

    No family history of Breast Cancer   

 

    No family history of Ovarian Cancer   

 

    No family history of Osteoporosis   

 

    No family history of Hypertension   







Immunizations







 Vaccine  Date  Status  Comments

 

 Unknown 







Payers







 Payer name  Insurance type  Covered party ID  Authorization(s)

 

 [employer]  [id number]   







Social History







 Type  Description  Quantity  Date Captured

 

 Alcohol Use Details  No     

 

 Caffeine Use Details  Unknown     

 

 Tobacco Use Status  Never smoked tobacco     

 

 Smoking Status  Never smoker     







Vital Signs







 Date / Time:  Height  Weight  BMI  Pulse Rate  Blood Pressure  Temperature  
Respiratory Rate  Body Surface Area  Head Circumference  BMI percentile

 

  9:07 AM  62.00 in  131.30 lbs  24.16 kg/meter(2)     122/72 mm[Hg]
               







Chief Complaint And Reason For Visit





*** Most recent encounter only, dated '2017 09:00'. ***



pelvic pain (chief complaint). Description: The pain started suddenly 5days.  
Severity level is moderate.  It occurs continuously.  Region of the pain is 
right lower quadrant. The pain radiates to the back.  The patient describes her 
pain as sharp, and stabbing. This is a new problem for her.  She is also 
experiencing dysmenorrhea.  She denies abdominal distention, constipation, 
diarrhea, dysuria, fever, hematochezia, high risk sexual activity, 
intramenstrual bleeding, nausea, ovarian cysts, vaginal discharge and vomiting. 
pt states taking aleve around the clock for 5 daysand this has provided no 
relief. stats she had a CT  at ER and was told she may have ovarian cyst 
but would need pelvic sono to confirm.



Reason For Referral







 Reason For Referral

 

 Unknown







Plan Of Care







 Date  Type  Action  Status

 

   Future Order: Radiology Order  Pelvic Ultrasound ([id number])  Ordered









 Date  Type  Problem  Goal  Intervention  Status  Start Date









 Unknown. 







History Of Present Illness







 Encounter Date  Complaint  History Of Present Illness

 

   pelvic pain  The pain started suddenly 5days.  Severity level is 
moderate.  It occurs continuously.  Region of the pain is right lower quadrant. 
The pain radiates to the back.  The patient describes her pain as sharp, and 
stabbing. This is a new problem for her.  She is also experiencing 
dysmenorrhea.  She denies abdominal distention, constipation, diarrhea, dysuria
, fever, hematochezia, high risk sexual activity, intramenstrual bleeding, 
nausea, ovarian cysts, vaginal discharge and vomiting. pt states taking aleve 
around the clock for 5 daysand this has provided no relief. stats she had a CT 
 at ER and was told she may have ovarian cyst but would need pelvic sono 
to confirm.







Functional Status







 Encounter Date  Functional Assessment  Cognitive Assessment

 

 Unknown 







Medications Administered







 Medication  Instructions  Dosage  Effective Dates (start - stop)  Status  
Comments

 

 Drug Treatment Unknown 







Instructions







 Date  Instruction  Additional Information

 

 Unknown Never